# Patient Record
Sex: MALE | Race: WHITE | NOT HISPANIC OR LATINO | Employment: FULL TIME | URBAN - METROPOLITAN AREA
[De-identification: names, ages, dates, MRNs, and addresses within clinical notes are randomized per-mention and may not be internally consistent; named-entity substitution may affect disease eponyms.]

---

## 2021-04-16 ENCOUNTER — IMMUNIZATION (OUTPATIENT)
Dept: NURSING | Facility: CLINIC | Age: 56
End: 2021-04-16
Payer: COMMERCIAL

## 2021-04-16 PROCEDURE — 0001A PR COVID VAC PFIZER DIL RECON 30 MCG/0.3 ML IM: CPT

## 2021-04-16 PROCEDURE — 91300 PR COVID VAC PFIZER DIL RECON 30 MCG/0.3 ML IM: CPT

## 2021-05-02 ENCOUNTER — HEALTH MAINTENANCE LETTER (OUTPATIENT)
Age: 56
End: 2021-05-02

## 2021-05-07 ENCOUNTER — IMMUNIZATION (OUTPATIENT)
Dept: NURSING | Facility: CLINIC | Age: 56
End: 2021-05-07
Attending: INTERNAL MEDICINE
Payer: COMMERCIAL

## 2021-05-07 PROCEDURE — 91300 PR COVID VAC PFIZER DIL RECON 30 MCG/0.3 ML IM: CPT

## 2021-05-07 PROCEDURE — 0002A PR COVID VAC PFIZER DIL RECON 30 MCG/0.3 ML IM: CPT

## 2021-10-17 ENCOUNTER — HEALTH MAINTENANCE LETTER (OUTPATIENT)
Age: 56
End: 2021-10-17

## 2022-06-05 ENCOUNTER — HEALTH MAINTENANCE LETTER (OUTPATIENT)
Age: 57
End: 2022-06-05

## 2022-10-16 ENCOUNTER — HEALTH MAINTENANCE LETTER (OUTPATIENT)
Age: 57
End: 2022-10-16

## 2023-06-17 ENCOUNTER — HEALTH MAINTENANCE LETTER (OUTPATIENT)
Age: 58
End: 2023-06-17

## 2024-01-11 ENCOUNTER — OFFICE VISIT (OUTPATIENT)
Dept: SURGERY | Facility: CLINIC | Age: 59
End: 2024-01-11
Payer: COMMERCIAL

## 2024-01-11 VITALS
BODY MASS INDEX: 25.18 KG/M2 | DIASTOLIC BLOOD PRESSURE: 64 MMHG | WEIGHT: 170 LBS | HEART RATE: 89 BPM | OXYGEN SATURATION: 100 % | RESPIRATION RATE: 16 BRPM | HEIGHT: 69 IN | SYSTOLIC BLOOD PRESSURE: 114 MMHG

## 2024-01-11 DIAGNOSIS — K40.90 RIGHT INGUINAL HERNIA: Primary | ICD-10-CM

## 2024-01-11 PROCEDURE — 99204 OFFICE O/P NEW MOD 45 MIN: CPT | Performed by: SURGERY

## 2024-01-11 NOTE — PROGRESS NOTES
"[facility]  Surgery Consultation    CONSULTATION REQUESTED BY:  No Ref-Primary, Physician None    HPI: Patient is a 58-year-old gentleman who presents as a self-referral for evaluation of right inguinal hernia.  He states that he has noted an abnormality for around a year.  He first noticed this is a bulge.  He has what he describes as a little bit of discomfort or pain associated with this.  He notices this particularly prior to having a bowel movement or having to strain.  No signs or symptoms of incarceration or strangulation.  No GI or bowel obstruction symptoms.    PMH:   has no past medical history on file.  PSH:    has no past surgical history on file.  Social History:   reports that he has never smoked. He has never used smokeless tobacco.  Family History:  family history is not on file.  Medications/Allergies: Home medications and allergies reviewed.    ROS:  The 10 point Review of Systems is negative other than noted in the HPI.    Physical Exam:  /64   Pulse 89   Resp 16   Ht 1.753 m (5' 9\")   Wt 77.1 kg (170 lb)   SpO2 100%   BMI 25.10 kg/m    GENERAL: Generally appears well.  Psych: Alert and Oriented.  Normal affect  Eyes: Sclera clear  Respiratory:  Lungs clear to ausculation bilaterally with good air excursion  Cardiovascular:  Regular Rate and Rhythm with no murmurs gallops or rubs, normal peripheral pulses  GI: Abdomen Non Distended Soft Non-Tender  No hernias palpated..  Groin- I examined the patient in both the standing and supine positions. Right Groin- Small inguinal hernia.  Hernia was easily reduciable. Left Groin- No hernia Palpated. No scrotal or testicle abnormalities.  Lymphatic/Hematologic/Immune:  No femoral or cervical lymphadenopathy.  Integumentary:  No rashes  Neurological: grossly intact     All new lab and imaging data was reviewed.     Impression and Plan:  Patient is a 58 year old male with right inguinal hernia    PLAN: Options discussed.  " Recommend robotic assisted laparoscopic repair at his convenience  I discussed the pathophysiology of hernias and options for repair including laparoscopic VS open.  The risks associated with the procedure including, but not limited to, recurrence, nerve entrapment or injury, persistence of pain, injury to the bowel/bladder, infertility, hematoma, mesh migration, mesh infection, MI, and PE were discussed with the patient. He indicated understanding of the discussion, asked appropriate questions, and provided consent. Signs and symptoms of incarceration were discussed. If these develop in the interim, he promises to call or go straight to the ER. I have provided the patient with an information pamphlet.  Thank you very much for this consult.    Tremaine Polanco M.D.  [facility]  [phone]    Please route or send letter to:  Primary Care Provider (PCP) and Referring Provider

## 2024-01-15 ENCOUNTER — TELEPHONE (OUTPATIENT)
Dept: SURGERY | Facility: CLINIC | Age: 59
End: 2024-01-15
Payer: COMMERCIAL

## 2024-01-15 NOTE — TELEPHONE ENCOUNTER
Type of surgery: Robotic assisted laparoscopic right inguinal hernia repair, possible bilateral repair  Location of surgery: [facility]  Date and time of surgery: 3/20/24 at 7:30am  Surgeon: Dr. Tremaine Polanco  Pre-Op Appt Date: patient to schedule  Post-Op Appt Date: patient to schedule   Packet sent out: Yes  Pre-cert/Authorization completed:  Not Applicable  Date: 1/15/24

## 2024-01-15 NOTE — TELEPHONE ENCOUNTER
Type of surgery: Robotic assisted laparoscopic right inguinal hernia repair, possible bilateral  Location of surgery: [facility]  Date and time of surgery: 3/20/24 at 7:30am  Surgeon: Dr. Tremaine Polanco  Pre-Op Appt Date: patient to schedule  Post-Op Appt Date: patient to schedule   Packet sent out: Yes  Pre-cert/Authorization completed:  Not Applicable  Date: 1/11/24

## 2024-03-19 ENCOUNTER — ANESTHESIA EVENT (OUTPATIENT)
Dept: SURGERY | Facility: CLINIC | Age: 59
End: 2024-03-19
Payer: COMMERCIAL

## 2024-03-19 ASSESSMENT — LIFESTYLE VARIABLES: TOBACCO_USE: 0

## 2024-03-19 NOTE — ANESTHESIA PREPROCEDURE EVALUATION
"Anesthesia Pre-Procedure Evaluation    Patient: Murali Velasquez   MRN: [id number] : 1965        Procedure : Procedure(s):  Robotic assisted laparoscopic right inguinal hernia repair possible bilateral          Past Medical History:   Diagnosis Date    Genital herpes       Past Surgical History:   Procedure Laterality Date    RHINOPLASTY        No Known Allergies   Social History     Tobacco Use    Smoking status: Never    Smokeless tobacco: Never   Substance Use Topics    Alcohol use: Never      Wt Readings from Last 1 Encounters:   24 77.1 kg (170 lb)        Anesthesia Evaluation   Pt has had prior anesthetic. Type of anesthetic: Rhinoplasty 30+ years ago.    History of anesthetic complications  - PONV.      ROS/MED HX  ENT/Pulmonary:    (-) tobacco use, asthma and recent URI   Neurologic:    (-) no seizures, no CVA and no TIA   Cardiovascular:    (-) arrhythmias   METS/Exercise Tolerance: >4 METS    Hematologic:    (-) history of blood clots   Musculoskeletal:       GI/Hepatic:     (+) GERD (Tums \"usually 2x daily\"), Symptomatic,               (-) liver disease   Renal/Genitourinary:    (-) renal disease   Endo:    (-) Type II DM and obesity   Psychiatric/Substance Use:       Infectious Disease:    (-) Recent Fever   Malignancy:       Other:            Physical Exam    Airway        Mallampati: I   TM distance: > 3 FB   Neck ROM: full   Mouth opening: > 3 cm    Respiratory Devices and Support         Dental       (+) Minor Abnormalities - some fillings, tiny chips      Cardiovascular          Rhythm and rate: regular and normal     Pulmonary           breath sounds clear to auscultation               Anesthesia Plan    ASA Status:  1    NPO Status:  NPO Appropriate    Anesthesia Type: General.     - Airway: ETT   Induction: Intravenous, Propofol, RSI.   Maintenance: Inhalation.        Consents    Anesthesia Plan(s) and associated risks, benefits, and realistic alternatives discussed. Questions " answered and patient/representative(s) expressed understanding.     - Discussed: Risks, Benefits and Alternatives for the PROCEDURE were discussed     - Discussed with:  Patient            Postoperative Care    Pain management: Multi-modal analgesia, IV analgesics, Oral pain medications.   PONV prophylaxis: Ondansetron (or other 5HT-3), Dexamethasone or Solumedrol, Background Propofol Infusion     Comments:               Mansi Crawford MD    I have reviewed the pertinent notes and labs in the chart from the past 30 days and (re)examined the patient.  Any updates or changes from those notes are reflected in this note.

## 2024-03-20 ENCOUNTER — APPOINTMENT (OUTPATIENT)
Dept: SURGERY | Facility: PHYSICIAN GROUP | Age: 59
End: 2024-03-20
Payer: COMMERCIAL

## 2024-03-20 ENCOUNTER — ANESTHESIA (OUTPATIENT)
Dept: SURGERY | Facility: CLINIC | Age: 59
End: 2024-03-20
Payer: COMMERCIAL

## 2024-03-20 ENCOUNTER — HOSPITAL ENCOUNTER (OUTPATIENT)
Facility: CLINIC | Age: 59
Discharge: HOME | End: 2024-03-20
Attending: SURGERY | Admitting: SURGERY
Payer: COMMERCIAL

## 2024-03-20 VITALS
DIASTOLIC BLOOD PRESSURE: 76 MMHG | WEIGHT: 170.8 LBS | BODY MASS INDEX: 25.3 KG/M2 | HEIGHT: 69 IN | RESPIRATION RATE: 16 BRPM | TEMPERATURE: 97.4 F | OXYGEN SATURATION: 100 % | SYSTOLIC BLOOD PRESSURE: 124 MMHG | HEART RATE: 74 BPM

## 2024-03-20 DIAGNOSIS — K40.90 RIGHT INGUINAL HERNIA: Primary | ICD-10-CM

## 2024-03-20 PROCEDURE — 250N000013 HC RX MED GY IP 250 OP 250 PS 637: Performed by: ANESTHESIOLOGY

## 2024-03-20 PROCEDURE — 250N000011 HC RX IP 250 OP 636

## 2024-03-20 PROCEDURE — S2900 ROBOTIC SURGICAL SYSTEM: HCPCS | Performed by: SURGERY

## 2024-03-20 PROCEDURE — 250N000011 HC RX IP 250 OP 636: Performed by: ANESTHESIOLOGY

## 2024-03-20 PROCEDURE — 49650 LAP ING HERNIA REPAIR INIT: CPT | Performed by: ANESTHESIOLOGY

## 2024-03-20 PROCEDURE — 258N000003 HC RX IP 258 OP 636

## 2024-03-20 PROCEDURE — 250N000025 HC SEVOFLURANE, PER MIN: Performed by: SURGERY

## 2024-03-20 PROCEDURE — 49650 LAP ING HERNIA REPAIR INIT: CPT | Mod: AS | Performed by: PHYSICIAN ASSISTANT

## 2024-03-20 PROCEDURE — C1781 MESH (IMPLANTABLE): HCPCS | Performed by: SURGERY

## 2024-03-20 PROCEDURE — 272N000001 HC OR GENERAL SUPPLY STERILE: Performed by: SURGERY

## 2024-03-20 PROCEDURE — 360N000080 HC SURGERY LEVEL 7, PER MIN: Performed by: SURGERY

## 2024-03-20 PROCEDURE — 250N000011 HC RX IP 250 OP 636: Performed by: SURGERY

## 2024-03-20 PROCEDURE — 710N000012 HC RECOVERY PHASE 2, PER MINUTE: Performed by: SURGERY

## 2024-03-20 PROCEDURE — 250N000009 HC RX 250

## 2024-03-20 PROCEDURE — 999N000141 HC STATISTIC PRE-PROCEDURE NURSING ASSESSMENT: Performed by: SURGERY

## 2024-03-20 PROCEDURE — 710N000009 HC RECOVERY PHASE 1, LEVEL 1, PER MIN: Performed by: SURGERY

## 2024-03-20 PROCEDURE — 250N000009 HC RX 250: Performed by: SURGERY

## 2024-03-20 PROCEDURE — 250N000013 HC RX MED GY IP 250 OP 250 PS 637: Performed by: PHYSICIAN ASSISTANT

## 2024-03-20 PROCEDURE — 49650 LAP ING HERNIA REPAIR INIT: CPT

## 2024-03-20 PROCEDURE — 49650 LAP ING HERNIA REPAIR INIT: CPT | Mod: RT | Performed by: SURGERY

## 2024-03-20 PROCEDURE — 370N000017 HC ANESTHESIA TECHNICAL FEE, PER MIN: Performed by: SURGERY

## 2024-03-20 DEVICE — MESH PROGRIP LAPAROSCOPIC 5.9X3.9" PARIETEX SELF-FIX LPG1510: Type: IMPLANTABLE DEVICE | Site: INGUINAL | Status: FUNCTIONAL

## 2024-03-20 RX ORDER — OXYCODONE HYDROCHLORIDE 5 MG/1
5 TABLET ORAL
Status: COMPLETED | OUTPATIENT
Start: 2024-03-20 | End: 2024-03-20

## 2024-03-20 RX ORDER — PROPOFOL 10 MG/ML
INJECTION, EMULSION INTRAVENOUS CONTINUOUS PRN
Status: DISCONTINUED | OUTPATIENT
Start: 2024-03-20 | End: 2024-03-20

## 2024-03-20 RX ORDER — CEFAZOLIN SODIUM/WATER 2 G/20 ML
2 SYRINGE (ML) INTRAVENOUS
Status: COMPLETED | OUTPATIENT
Start: 2024-03-20 | End: 2024-03-20

## 2024-03-20 RX ORDER — PROPOFOL 10 MG/ML
INJECTION, EMULSION INTRAVENOUS PRN
Status: DISCONTINUED | OUTPATIENT
Start: 2024-03-20 | End: 2024-03-20

## 2024-03-20 RX ORDER — FENTANYL CITRATE 50 UG/ML
50 INJECTION, SOLUTION INTRAMUSCULAR; INTRAVENOUS EVERY 5 MIN PRN
Status: DISCONTINUED | OUTPATIENT
Start: 2024-03-20 | End: 2024-03-20 | Stop reason: HOSPADM

## 2024-03-20 RX ORDER — BUPIVACAINE HYDROCHLORIDE AND EPINEPHRINE 2.5; 5 MG/ML; UG/ML
INJECTION, SOLUTION EPIDURAL; INFILTRATION; INTRACAUDAL; PERINEURAL PRN
Status: DISCONTINUED | OUTPATIENT
Start: 2024-03-20 | End: 2024-03-20 | Stop reason: HOSPADM

## 2024-03-20 RX ORDER — CEFAZOLIN SODIUM/WATER 2 G/20 ML
2 SYRINGE (ML) INTRAVENOUS SEE ADMIN INSTRUCTIONS
Status: DISCONTINUED | OUTPATIENT
Start: 2024-03-20 | End: 2024-03-20 | Stop reason: HOSPADM

## 2024-03-20 RX ORDER — DEXAMETHASONE SODIUM PHOSPHATE 4 MG/ML
INJECTION, SOLUTION INTRA-ARTICULAR; INTRALESIONAL; INTRAMUSCULAR; INTRAVENOUS; SOFT TISSUE PRN
Status: DISCONTINUED | OUTPATIENT
Start: 2024-03-20 | End: 2024-03-20

## 2024-03-20 RX ORDER — ONDANSETRON 2 MG/ML
INJECTION INTRAMUSCULAR; INTRAVENOUS PRN
Status: DISCONTINUED | OUTPATIENT
Start: 2024-03-20 | End: 2024-03-20

## 2024-03-20 RX ORDER — NALOXONE HYDROCHLORIDE 0.4 MG/ML
0.1 INJECTION, SOLUTION INTRAMUSCULAR; INTRAVENOUS; SUBCUTANEOUS
Status: DISCONTINUED | OUTPATIENT
Start: 2024-03-20 | End: 2024-03-20 | Stop reason: HOSPADM

## 2024-03-20 RX ORDER — KETOROLAC TROMETHAMINE 30 MG/ML
INJECTION, SOLUTION INTRAMUSCULAR; INTRAVENOUS PRN
Status: DISCONTINUED | OUTPATIENT
Start: 2024-03-20 | End: 2024-03-20

## 2024-03-20 RX ORDER — SODIUM CHLORIDE, SODIUM LACTATE, POTASSIUM CHLORIDE, CALCIUM CHLORIDE 600; 310; 30; 20 MG/100ML; MG/100ML; MG/100ML; MG/100ML
INJECTION, SOLUTION INTRAVENOUS CONTINUOUS PRN
Status: DISCONTINUED | OUTPATIENT
Start: 2024-03-20 | End: 2024-03-20

## 2024-03-20 RX ORDER — HYDROMORPHONE HCL IN WATER/PF 6 MG/30 ML
0.4 PATIENT CONTROLLED ANALGESIA SYRINGE INTRAVENOUS EVERY 5 MIN PRN
Status: DISCONTINUED | OUTPATIENT
Start: 2024-03-20 | End: 2024-03-20 | Stop reason: HOSPADM

## 2024-03-20 RX ORDER — HYDROMORPHONE HCL IN WATER/PF 6 MG/30 ML
0.2 PATIENT CONTROLLED ANALGESIA SYRINGE INTRAVENOUS EVERY 5 MIN PRN
Status: DISCONTINUED | OUTPATIENT
Start: 2024-03-20 | End: 2024-03-20 | Stop reason: HOSPADM

## 2024-03-20 RX ORDER — OXYCODONE HYDROCHLORIDE 5 MG/1
5-10 TABLET ORAL EVERY 4 HOURS PRN
Qty: 12 TABLET | Refills: 0 | Status: SHIPPED | OUTPATIENT
Start: 2024-03-20

## 2024-03-20 RX ORDER — ACETAMINOPHEN 500 MG
1000 TABLET ORAL ONCE
Status: COMPLETED | OUTPATIENT
Start: 2024-03-20 | End: 2024-03-20

## 2024-03-20 RX ORDER — FENTANYL CITRATE 50 UG/ML
25 INJECTION, SOLUTION INTRAMUSCULAR; INTRAVENOUS EVERY 5 MIN PRN
Status: DISCONTINUED | OUTPATIENT
Start: 2024-03-20 | End: 2024-03-20 | Stop reason: HOSPADM

## 2024-03-20 RX ORDER — ONDANSETRON 2 MG/ML
4 INJECTION INTRAMUSCULAR; INTRAVENOUS EVERY 30 MIN PRN
Status: DISCONTINUED | OUTPATIENT
Start: 2024-03-20 | End: 2024-03-20 | Stop reason: HOSPADM

## 2024-03-20 RX ORDER — LIDOCAINE HYDROCHLORIDE 20 MG/ML
INJECTION, SOLUTION INFILTRATION; PERINEURAL PRN
Status: DISCONTINUED | OUTPATIENT
Start: 2024-03-20 | End: 2024-03-20

## 2024-03-20 RX ORDER — SODIUM CHLORIDE, SODIUM LACTATE, POTASSIUM CHLORIDE, CALCIUM CHLORIDE 600; 310; 30; 20 MG/100ML; MG/100ML; MG/100ML; MG/100ML
INJECTION, SOLUTION INTRAVENOUS CONTINUOUS
Status: DISCONTINUED | OUTPATIENT
Start: 2024-03-20 | End: 2024-03-20 | Stop reason: HOSPADM

## 2024-03-20 RX ORDER — AMOXICILLIN 250 MG
1-2 CAPSULE ORAL 2 TIMES DAILY PRN
Qty: 10 TABLET | Refills: 0 | Status: SHIPPED | OUTPATIENT
Start: 2024-03-20

## 2024-03-20 RX ORDER — FENTANYL CITRATE 50 UG/ML
INJECTION, SOLUTION INTRAMUSCULAR; INTRAVENOUS PRN
Status: DISCONTINUED | OUTPATIENT
Start: 2024-03-20 | End: 2024-03-20

## 2024-03-20 RX ORDER — ONDANSETRON 4 MG/1
4 TABLET, ORALLY DISINTEGRATING ORAL EVERY 30 MIN PRN
Status: DISCONTINUED | OUTPATIENT
Start: 2024-03-20 | End: 2024-03-20 | Stop reason: HOSPADM

## 2024-03-20 RX ORDER — MAGNESIUM HYDROXIDE 1200 MG/15ML
LIQUID ORAL PRN
Status: DISCONTINUED | OUTPATIENT
Start: 2024-03-20 | End: 2024-03-20 | Stop reason: HOSPADM

## 2024-03-20 RX ORDER — CALCIUM CARBONATE 500 MG/1
1 TABLET, CHEWABLE ORAL PRN
COMMUNITY

## 2024-03-20 RX ADMIN — KETOROLAC TROMETHAMINE 15 MG: 30 INJECTION, SOLUTION INTRAMUSCULAR at 10:52

## 2024-03-20 RX ADMIN — PHENYLEPHRINE HYDROCHLORIDE 100 MCG: 10 INJECTION INTRAVENOUS at 10:16

## 2024-03-20 RX ADMIN — PROPOFOL 200 MG: 10 INJECTION, EMULSION INTRAVENOUS at 10:02

## 2024-03-20 RX ADMIN — SODIUM CHLORIDE, POTASSIUM CHLORIDE, SODIUM LACTATE AND CALCIUM CHLORIDE: 600; 310; 30; 20 INJECTION, SOLUTION INTRAVENOUS at 09:59

## 2024-03-20 RX ADMIN — LIDOCAINE HYDROCHLORIDE 100 MG: 20 INJECTION, SOLUTION INFILTRATION; PERINEURAL at 10:02

## 2024-03-20 RX ADMIN — FENTANYL CITRATE 50 MCG: 50 INJECTION, SOLUTION INTRAMUSCULAR; INTRAVENOUS at 11:29

## 2024-03-20 RX ADMIN — DEXAMETHASONE SODIUM PHOSPHATE 4 MG: 4 INJECTION, SOLUTION INTRA-ARTICULAR; INTRALESIONAL; INTRAMUSCULAR; INTRAVENOUS; SOFT TISSUE at 10:09

## 2024-03-20 RX ADMIN — ONDANSETRON 4 MG: 2 INJECTION INTRAMUSCULAR; INTRAVENOUS at 12:34

## 2024-03-20 RX ADMIN — ACETAMINOPHEN 1000 MG: 500 TABLET, FILM COATED ORAL at 11:56

## 2024-03-20 RX ADMIN — FENTANYL CITRATE 50 MCG: 50 INJECTION INTRAMUSCULAR; INTRAVENOUS at 10:09

## 2024-03-20 RX ADMIN — PROPOFOL 30 MCG/KG/MIN: 10 INJECTION, EMULSION INTRAVENOUS at 10:09

## 2024-03-20 RX ADMIN — FENTANYL CITRATE 50 MCG: 50 INJECTION, SOLUTION INTRAMUSCULAR; INTRAVENOUS at 11:37

## 2024-03-20 RX ADMIN — FENTANYL CITRATE 50 MCG: 50 INJECTION INTRAMUSCULAR; INTRAVENOUS at 10:02

## 2024-03-20 RX ADMIN — HYDROMORPHONE HYDROCHLORIDE 0.2 MG: 0.2 INJECTION, SOLUTION INTRAMUSCULAR; INTRAVENOUS; SUBCUTANEOUS at 11:51

## 2024-03-20 RX ADMIN — PHENYLEPHRINE HYDROCHLORIDE 100 MCG: 10 INJECTION INTRAVENOUS at 10:13

## 2024-03-20 RX ADMIN — ROCURONIUM BROMIDE 50 MG: 50 INJECTION, SOLUTION INTRAVENOUS at 10:10

## 2024-03-20 RX ADMIN — Medication 2 G: at 09:59

## 2024-03-20 RX ADMIN — MIDAZOLAM 2 MG: 1 INJECTION INTRAMUSCULAR; INTRAVENOUS at 09:59

## 2024-03-20 RX ADMIN — SUGAMMADEX 200 MG: 100 INJECTION, SOLUTION INTRAVENOUS at 10:55

## 2024-03-20 RX ADMIN — SUCCINYLCHOLINE CHLORIDE 40 MG: 20 INJECTION, SOLUTION INTRAMUSCULAR; INTRAVENOUS; PARENTERAL at 10:02

## 2024-03-20 RX ADMIN — HYDROMORPHONE HYDROCHLORIDE 0.5 MG: 1 INJECTION, SOLUTION INTRAMUSCULAR; INTRAVENOUS; SUBCUTANEOUS at 10:29

## 2024-03-20 RX ADMIN — OXYCODONE HYDROCHLORIDE 5 MG: 5 TABLET ORAL at 11:54

## 2024-03-20 RX ADMIN — ONDANSETRON 4 MG: 2 INJECTION INTRAMUSCULAR; INTRAVENOUS at 10:40

## 2024-03-20 ASSESSMENT — ACTIVITIES OF DAILY LIVING (ADL)
ADLS_ACUITY_SCORE: 35
ADLS_ACUITY_SCORE: 35
ADLS_ACUITY_SCORE: 33
ADLS_ACUITY_SCORE: 35

## 2024-03-20 ASSESSMENT — ENCOUNTER SYMPTOMS
SEIZURES: 0
DYSRHYTHMIAS: 0

## 2024-03-20 NOTE — ANESTHESIA PROCEDURE NOTES
Airway       Patient location during procedure: OR       Procedure Start/Stop Times: 3/20/2024 10:04 AM  Staff -        Anesthesiologist:  Mansi Crawford MD       CRNA: Simi Chen APRN CRNA       Performed By: CRNA  Consent for Airway        Urgency: elective  Indications and Patient Condition       Indications for airway management: courtney-procedural       Induction type:RSI       Mask difficulty assessment: 0 - not attempted    Final Airway Details       Final airway type: endotracheal airway       Successful airway: ETT - single  Endotracheal Airway Details        ETT size (mm): 8.0       Cuffed: yes       Successful intubation technique: video laryngoscopy       VL Blade Size: Collins 4       Grade View of Cords: 1       Adjucts: stylet       Position: Right       Measured from: lips       Secured at (cm): 21       Bite block used: None    Post intubation assessment        Placement verified by: capnometry, equal breath sounds and chest rise        Number of attempts at approach: 1       Secured with: tape       Ease of procedure: easy       Dentition: Intact and Unchanged    Medication(s) Administered   Medication Administration Time: 3/20/2024 10:04 AM

## 2024-03-20 NOTE — ANESTHESIA POSTPROCEDURE EVALUATION
Patient: Murali Velasquez    Procedure: Procedure(s):  Robotic assisted laparoscopic right inguinal hernia repair       Anesthesia Type:  General    Note:  Disposition: Outpatient   Postop Pain Control: Uneventful            Sign Out: Well controlled pain   PONV: No   Neuro/Psych: Uneventful            Sign Out: Acceptable/Baseline neuro status   Airway/Respiratory: Uneventful            Sign Out: Acceptable/Baseline resp. status   CV/Hemodynamics: Uneventful            Sign Out: Acceptable CV status; No obvious hypovolemia; No obvious fluid overload   Other NRE: NONE   DID A NON-ROUTINE EVENT OCCUR? No           Last vitals:  Vitals Value Taken Time   /70 03/20/24 1232   Temp 36.2  C (97.2  F) 03/20/24 1145   Pulse 77 03/20/24 1232   Resp 12 03/20/24 1219   SpO2 100 % 03/20/24 1240   Vitals shown include unfiled device data.    Electronically Signed By: Mansi Crawford MD  March 20, 2024  1:52 PM

## 2024-03-20 NOTE — DISCHARGE INSTRUCTIONS
Today you received Toradol, an antiinflammatory medication similar to Ibuprofen.  You should not take other antiinflammatory medication, such as Ibuprofen, Motrin, Advil, Aleve, Naprosyn, etc until 5 p.m.     Today you were given 1000 mg of Tylenol at 11:54 a.m. The recommended daily maximum dose is 4000 mg.     [facility]  Discharge Instructions: Post-Operative Inguinal Hernia Repair    ACTIVITY  Take frequent, short walks and increase your activity gradually.    Avoid strenuous physical activity or heavy lifting greater than 15 lbs. for 3-4 weeks.  You may climb stairs.  You may drive without restrictions when you are not using any prescription pain medication and feel comfortable in a car.  You may return to work/school when you are comfortable without any prescription pain medication.    WOUND CARE  You may remove your outer dressing or Band-Aids and shower 48 hours after the surgery.  Pat your incisions dry and leave them open to air.  Re-apply dressing (Band-Aids or gauze/tape) as needed for comfort or drainage.  You may have steri-strips (looks like white tape) on your incision.  You may peel off the steri-strips 2 weeks after your surgery if they have not peeled off on their own.  If you have skin glue, it will peel up and fall off on its own.  Do not soak your incisions in a tub or pool for 2 weeks.   Do not apply any lotions, creams, or ointments to your incisions.  A ridge under your incisions is normal and will gradually resolve.    DIET  Start with liquids, then gradually resume your regular diet as tolerated.   Drink plenty of fluids to stay hydrated.    PAIN  Expect some tenderness and discomfort at the incision site(s).  Use the prescribed pain medication at your discretion.  Expect gradual resolution of your pain over several days.  You may take ibuprofen with food (unless you have been told not to) or acetaminophen/Tylenol instead of or in addition to your prescribed  pain medication.  However, if you are taking Norco or Percocet, do not take any additional acetaminophen/Tylenol.  Do not drink alcohol or drive while you are taking pain medications.  You may apply ice to your incisions in 20 minute intervals as needed for the next 48 hours.  After that time, consider switching to heat if you prefer.    EXPECTATIONS  You may notice air in your scrotum and/or penis after the surgery.  This is due to the gas used during the surgery.  You can expect some swelling and bruising involving the scrotum and/or penis as well as numbness.  These symptoms are expected and should gradually resolve in the next few days.  You may use ice to help with the swelling and try placing a rolled hand towel below your scrotum to help alleviate scrotal discomfort.  If you develop significant testicular or penile pain, please call our office and speak with a nurse.  Pain medications can cause constipation.  Limit use when possible.  Take an over the counter or prescribed stool softener/stimulant, such as Colace or Senna, 1-2 times a day with plenty of water.  You may take a mild over the counter laxative, such as Miralax or a suppository, as needed.  You may take 1 oz. (2 tablespoons) Milk of Magnesia the evening following surgery to encourage bowel movement.  You may discontinue these medications once you are having regular bowel movements and/or are no longer taking your narcotic pain medication.   You may have shoulder or upper back discomfort due to the gas used in surgery.  This is temporary and should resolve in 48-72 hours.  Short, frequent walks may help with this.  If you are unable to urinate for 8 hours or feel as though you are not emptying your bladder adequately, we recommend you seek care at an ER or Urgent Care facility for possible catheter placement.     FOLLOW UP  Our office will contact you in approximately 2-3 weeks to check on your progress and answer any questions you may have.  If  you are doing well, you will not need to return for a follow up appointment.  If any concerns are identified over the phone, we will help you make an appointment to see a provider.   If you have not received a phone call, have any questions or concerns, or would like to be seen, please call us at [phone] and ask to speak with our nurse.  We are located at [address] [place] [address].    CALL OUR OFFICE -[phone] IF YOU HAVE:   Chills or fever above 101 F.  Increased redness, warmth, or drainage at your incisions.  Significant bleeding.  Pain not relieved by your pain medication or rest.  Increasing pain after the first 48 hours.  Any other concerns or questions.             Revised October 2022     Same Day Surgery Discharge Instructions for  Sedation and General Anesthesia     It's not unusual to feel dizzy, light-headed or faint for up to 24 hours after surgery or while taking pain medication.  If you have these symptoms: sit for a few minutes before standing and have someone assist you when you get up to walk or use the bathroom.    You should rest and relax for the next 24 hours. We recommend you make arrangements to have an adult stay with you for at least 24 hours after your discharge.  Avoid hazardous and strenuous activity.    DO NOT DRIVE any vehicle or operate mechanical equipment for 24 hours following the end of your surgery.  Even though you may feel normal, your reactions may be affected by the medication you have received.    Do not drink alcoholic beverages for 24 hours following surgery.     Slowly progress to your regular diet as you feel able. It's not unusual to feel nauseated and/or vomit after receiving anesthesia.  If you develop these symptoms, drink clear liquids (apple juice, ginger ale, broth, 7-up, etc. ) until you feel better.  If your nausea and vomiting persists for 24 hours, please notify your surgeon.      All narcotic pain medications, along with  inactivity and anesthesia, can cause constipation. Drinking plenty of liquids and increasing fiber intake will help.    For any questions of a medical nature, call your surgeon.    Do not make important decisions for 24 hours.    If you had general anesthesia, you may have a sore throat for a couple of days related to the breathing tube used during surgery.  You may use Cepacol lozenges to help with this discomfort.  If it worsens or if you develop a fever, contact your surgeon.     If you feel your pain is not well managed with the pain medications prescribed by your surgeon, please contact your surgeon's office to let them know so they can address your concerns.     **If you have questions or concerns about your procedure,  call Dr. Polanco at [phone]**

## 2024-03-20 NOTE — ANESTHESIA CARE TRANSFER NOTE
Patient: Murali Velasquez    Procedure: Procedure(s):  Robotic assisted laparoscopic right inguinal hernia repair       Diagnosis: Right inguinal hernia [K40.90]  Diagnosis Additional Information: No value filed.    Anesthesia Type:   General     Note:    Oropharynx: oropharynx clear of all foreign objects and spontaneously breathing  Level of Consciousness: drowsy and awake  Oxygen Supplementation: face mask  Level of Supplemental Oxygen (L/min / FiO2): 6  Independent Airway: airway patency satisfactory and stable  Dentition: dentition unchanged  Vital Signs Stable: post-procedure vital signs reviewed and stable  Report to RN Given: handoff report given  Patient transferred to: PACU    Handoff Report: Identifed the Patient, Identified the Reponsible Provider, Reviewed the pertinent medical history, Discussed the surgical course, Reviewed Intra-OP anesthesia mangement and issues during anesthesia, Set expectations for post-procedure period and Allowed opportunity for questions and acknowledgement of understanding    Vitals:  Vitals Value Taken Time   BP     Temp     Pulse     Resp     SpO2 98 % 03/20/24 1107   Vitals shown include unfiled device data.    Electronically Signed By: NGOZI Bear CRNA  March 20, 2024  11:08 AM

## 2024-03-20 NOTE — OP NOTE
Preoperative diagnosis: Right inguinal hernia    Postoperative diagnosis: Right inguinal hernia    Procedure: Robotic assisted laparoscopic transabdominal preperitoneal right inguinal hernia repair with mesh    Surgeon: Tremaine Polanco MD    Assistant: Henrique Nichols PA-C, Physician assistant first assistant was necessary during the performance of this procedure for expertise in patient positioning, prepping, draping, trocar placement, camera management, retraction and exposure, and suctioning.    Anesthesia: General endotracheal    Estimated blood loss: 5 cc        Indication for procedure: This is a 59-year-old gentleman who presented to my office with symptomatic inguinal hernia.  We had an exhaustive discussion regarding therapeutic options.  It was ultimately elected to proceed with robotic assisted laparoscopic repair.  The potential risks of bleeding, infection, neurovascular injury to the vas deferens or testicle, bowel or bladder injury, recurrent hernia, chronic pain were all reviewed in great detail.  The patient's questions were thoroughly answered.  Informed consent was provided.    Procedure: After informed consent was obtained the patient was taken to the operating room and placed supine in the operating room table.  Following the induction of adequate general endotracheal anesthesia, the patient's abdomen was shaved, prepped and draped in usual fashion.  A surgeon initiated timeout was then completed.  Appropriate IV antibiotics were administered for surgical prophylaxis.  Local anesthetic was then injected at the site above the umbilicus at the midline.  A skin incision was made at this location and the Veress needle was passed into an intra-abdominal position.  The intra-abdominal position of the needle was confirmed using the saline drop test.  A carbon dioxide pneumoperitoneum was then established.  After adequate insufflation the insufflation needle was removed and replaced with an 8 mm robotic  port.  Under direct vision after the infiltration of local anesthetic 2 additional 8 mm ports were placed at the level of the first port in the right and left abdomen.  The robot was then uneventfully docked.  I assumed control of the surgeon console.  Starting on the right side the peritoneum was grasped well above the inguinal canal and incised.I then began dissecting into the preperitoneal plane.  This dissection was carried down into the inguinal canal.  Peritoneum was mobilized well down below Flahs's ligament as well as laterally to expose the transverse abdominis.  The spermatic cord and its contents were skeletonized.  Indirect hernia was present and reduced.  I then elected to repair this utilizing a 10 x 15 cm piece of pro- mesh.  This mesh was introduced into the abdominal cavity and placed appropriately within the inguinal canal.  With the mesh in good position the peritoneum was sutured closed using 2.0 strata fix suture.  The robot was then undocked.  The trochars were opened and the carbon dioxide was massaged from the abdomen.  Trochars were subsequently removed and the skin incisions were closed with subcuticular 4-0 Vicryl sutures.  Sponge, needle and instrument counts were correct.  Patient tolerated this well.  He was awakened in the operating room, extubated and taken to the recovery room in stable condition.    Tremaine Polanco MD

## 2024-04-18 ENCOUNTER — TELEPHONE (OUTPATIENT)
Dept: SURGERY | Facility: CLINIC | Age: 59
End: 2024-04-18
Payer: COMMERCIAL

## 2024-04-18 NOTE — TELEPHONE ENCOUNTER
[facility]  Post op call note - No Answer    Murali Velasquez was called for an update regarding his recovery.  There was no answer and a message was left instructing the patient to call the office with any questions or concerns.     Renato Nichols PA-C

## 2024-08-10 ENCOUNTER — HEALTH MAINTENANCE LETTER (OUTPATIENT)
Age: 59
End: 2024-08-10

## 2024-12-21 NOTE — OR NURSING
Pt requesting additional pain medication at this time. Dr. Talamantes notified.    VSS. A&O. Pain tolerable per pt. Incision CDI. intermittent nausea relived after antiemetic, aromatherapy, sips of clears and crackers. DC instructions, meds and follow up reviewed with patient and wife. DC to home with wife.

## 2025-08-16 ENCOUNTER — HEALTH MAINTENANCE LETTER (OUTPATIENT)
Age: 60
End: 2025-08-16

## (undated) DEVICE — PACK LAP CHOLE SLC15LCFSD

## (undated) DEVICE — SYR 10ML FINGER CONTROL W/O NDL 309695

## (undated) DEVICE — DAVINCI XI DRAPE ARM 470015

## (undated) DEVICE — LUBRICANT INST ELECTROLUBE EL101

## (undated) DEVICE — DAVINCI XI OBTURATOR BLADELESS 8MM 470359

## (undated) DEVICE — DAVINCI XI GRASPER ENDOWRIST PROGRASP 470093

## (undated) DEVICE — ESU GROUND PAD UNIVERSAL W/O CORD

## (undated) DEVICE — ANTIFOG SOLUTION SEE SHARP 150M TROCAR SWABS 30978

## (undated) DEVICE — SUCTION CANISTER MEDIVAC LINER 3000ML W/LID 65651-530

## (undated) DEVICE — LINEN TOWEL PACK X5 5464

## (undated) DEVICE — DAVINCI HOT SHEARS TIP COVER  400180

## (undated) DEVICE — SU VICRYL 4-0 PS-2 18" UND J496H

## (undated) DEVICE — DAVINCI XI SEAL UNIVERSAL 5-8MM 470361

## (undated) DEVICE — NDL INSUFFLATION 13GA 120MM C2201

## (undated) DEVICE — BLADE CLIPPER 4406

## (undated) DEVICE — EVAC SYSTEM CLEAR FLOW SC082500

## (undated) DEVICE — LIGHT HANDLE X2

## (undated) DEVICE — SOL WATER IRRIG 1000ML BOTTLE 2F7114

## (undated) DEVICE — GOWN IMPERVIOUS SPECIALTY XLG/XLONG 32474

## (undated) DEVICE — DRAPE SHEET REV FOLD 3/4 9349

## (undated) DEVICE — PREP CHLORAPREP 26ML TINTED HI-LITE ORANGE 930815

## (undated) DEVICE — SU STRATAFIX PDS PLUS 2-0 SPIRAL SH 23CM SXPP1B433

## (undated) DEVICE — GLOVE BIOGEL PI SZ 8.0 40880

## (undated) DEVICE — DAVINCI XI DRAPE COLUMN 470341

## (undated) RX ORDER — CEFAZOLIN SODIUM/WATER 2 G/20 ML
SYRINGE (ML) INTRAVENOUS
Status: DISPENSED
Start: 2024-03-20

## (undated) RX ORDER — OXYCODONE HYDROCHLORIDE 5 MG/1
TABLET ORAL
Status: DISPENSED
Start: 2024-03-20

## (undated) RX ORDER — HYDROMORPHONE HCL IN WATER/PF 6 MG/30 ML
PATIENT CONTROLLED ANALGESIA SYRINGE INTRAVENOUS
Status: DISPENSED
Start: 2024-03-20

## (undated) RX ORDER — FENTANYL CITRATE 0.05 MG/ML
INJECTION, SOLUTION INTRAMUSCULAR; INTRAVENOUS
Status: DISPENSED
Start: 2024-03-20

## (undated) RX ORDER — ONDANSETRON 2 MG/ML
INJECTION INTRAMUSCULAR; INTRAVENOUS
Status: DISPENSED
Start: 2024-03-20

## (undated) RX ORDER — DEXAMETHASONE SODIUM PHOSPHATE 4 MG/ML
INJECTION, SOLUTION INTRA-ARTICULAR; INTRALESIONAL; INTRAMUSCULAR; INTRAVENOUS; SOFT TISSUE
Status: DISPENSED
Start: 2024-03-20

## (undated) RX ORDER — ACETAMINOPHEN 500 MG
TABLET ORAL
Status: DISPENSED
Start: 2024-03-20

## (undated) RX ORDER — FENTANYL CITRATE 50 UG/ML
INJECTION, SOLUTION INTRAMUSCULAR; INTRAVENOUS
Status: DISPENSED
Start: 2024-03-20

## (undated) RX ORDER — HYDROMORPHONE HYDROCHLORIDE 1 MG/ML
INJECTION, SOLUTION INTRAMUSCULAR; INTRAVENOUS; SUBCUTANEOUS
Status: DISPENSED
Start: 2024-03-20

## (undated) RX ORDER — PROPOFOL 10 MG/ML
INJECTION, EMULSION INTRAVENOUS
Status: DISPENSED
Start: 2024-03-20